# Patient Record
Sex: FEMALE | URBAN - METROPOLITAN AREA
[De-identification: names, ages, dates, MRNs, and addresses within clinical notes are randomized per-mention and may not be internally consistent; named-entity substitution may affect disease eponyms.]

---

## 2024-08-21 ENCOUNTER — OFFICE VISIT (OUTPATIENT)
Dept: FAMILY MEDICINE CLINIC | Facility: CLINIC | Age: 29
End: 2024-08-21
Payer: OTHER GOVERNMENT

## 2024-08-21 VITALS
WEIGHT: 231.4 LBS | BODY MASS INDEX: 37.19 KG/M2 | DIASTOLIC BLOOD PRESSURE: 80 MMHG | HEART RATE: 117 BPM | TEMPERATURE: 98.2 F | SYSTOLIC BLOOD PRESSURE: 126 MMHG | OXYGEN SATURATION: 97 % | HEIGHT: 66 IN | RESPIRATION RATE: 14 BRPM

## 2024-08-21 DIAGNOSIS — N91.2 AMENORRHEA: Primary | ICD-10-CM

## 2024-08-21 LAB
HCG QUALITATIVE, POC: NORMAL
HCG, PREGNANCY, URINE, POC: NEGATIVE
VALID INTERNAL CONTROL, POC: YES

## 2024-08-21 PROCEDURE — 84703 CHORIONIC GONADOTROPIN ASSAY: CPT | Performed by: STUDENT IN AN ORGANIZED HEALTH CARE EDUCATION/TRAINING PROGRAM

## 2024-08-21 NOTE — PROGRESS NOTES
[facility], [place], operated by [facility]      MR#: [id number]    HISTORY OF PRESENT ILLNESS  History of Present Illness  The patient is a 29-year-old female who presents with amenorrhea for the past week. Her hCG today was negative.    She has been experiencing irregular menstrual cycles, with her last period occurring a week ago on Wednesday. She reports no changes in medication, diet, or weight, and denies any increase in stress levels.    Past medical history:  Denies     Social:  Tobacco use: Never  Alcohol use: Denies   Illicit drug use: Denies  Housing: Lives in [place]  Employment:  , [employer]      Health maintenance:  Colon cancer screening: At 47  Breast cancer screening: At 42  Cervical cancer screening: NILM July 2022  COVID: Pfizer x 2  Influenza: UTD   PCV: At 67  Shingles: At 52    No current outpatient medications on file.    Review of Systems  /80 (Site: Left Upper Arm, Position: Sitting, Cuff Size: Medium Adult)   Pulse (!) 117   Temp 98.2 °F (36.8 °C) (Temporal)   Resp 14   Ht 1.676 m (5' 6\")   Wt 105 kg (231 lb 6.4 oz)   LMP 07/14/2024 (Exact Date)   SpO2 97%   BMI 37.35 kg/m²     Physical Exam   Results  Laboratory Studies  hCG test was negative.     ASSESSMENT and PLAN    Caro was seen today for amenorrhea.    Diagnoses and all orders for this visit:    Amenorrhea  -     AMB POC GONADOTROPIN, CHORIONIC (HCG); QUALITATIVE       Assessment & Plan  1. Amenorrhea.  The patient presents with amenorrhea for the past week. Her hCG test today was negative, indicating that pregnancy is not the cause. She reports no changes in medications, diet, weight, or stress levels. It was explained that sometimes menstrual cycles can be irregular due to hormonal, dietary, or stress-related factors. She was advised to maintain her current lifestyle and activities. If the amenorrhea persists for three months, further evaluation will be conducted. She is also scheduled to see

## 2024-08-21 NOTE — PROGRESS NOTES
Patient presented for HCG pregnancy test; menstrual is late by 7 days; HcG test was negative and documented.